# Patient Record
Sex: MALE | Race: WHITE | NOT HISPANIC OR LATINO | Employment: FULL TIME | URBAN - METROPOLITAN AREA
[De-identification: names, ages, dates, MRNs, and addresses within clinical notes are randomized per-mention and may not be internally consistent; named-entity substitution may affect disease eponyms.]

---

## 2023-09-25 ENCOUNTER — OFFICE VISIT (OUTPATIENT)
Dept: URGENT CARE | Facility: URGENT CARE | Age: 48
End: 2023-09-25
Payer: COMMERCIAL

## 2023-09-25 VITALS
HEART RATE: 61 BPM | BODY MASS INDEX: 25.03 KG/M2 | TEMPERATURE: 98.4 F | OXYGEN SATURATION: 98 % | DIASTOLIC BLOOD PRESSURE: 86 MMHG | WEIGHT: 195 LBS | HEIGHT: 74 IN | RESPIRATION RATE: 15 BRPM | SYSTOLIC BLOOD PRESSURE: 131 MMHG

## 2023-09-25 DIAGNOSIS — A63.0 GENITAL WARTS: Primary | ICD-10-CM

## 2023-09-25 PROCEDURE — 99203 OFFICE O/P NEW LOW 30 MIN: CPT | Performed by: PHYSICIAN ASSISTANT

## 2023-09-25 NOTE — PATIENT INSTRUCTIONS
Patient was educated on the natural course of genital wart. Discussed removal options if it bothers him. He will think it through and follow-up with dermatology if he desires removal. Seek emergency care if you develop severe pain or redness.

## 2023-09-25 NOTE — PROGRESS NOTES
"URGENT CARE VISIT:    SUBJECTIVE:   HPI:   Aj Marinelli is a 48 year old who presents with rash located over genital area since 6 week(s) ago. Rash is sudden onset and rash seems to be stable. He describes rash as asymptomatic. Patient denies difficulty breathing or throat/tongue swelling. Patient has tried none with no relief of symptoms. Patient has not had contact exposures to new laundry detergents, soaps, lotions, or other potential irritants. Denies new foods or medications.  Patient had similar lesion in the past which went away. No one around them has had a similar rash.     PMH: No past medical history on file.  Allergies: Patient has no known allergies.   Medications:   No current outpatient medications on file.     Social History:   Social History     Socioeconomic History    Marital status:      Spouse name: Not on file    Number of children: Not on file    Years of education: Not on file    Highest education level: Not on file   Occupational History    Not on file   Tobacco Use    Smoking status: Never    Smokeless tobacco: Never   Vaping Use    Vaping Use: Never used   Substance and Sexual Activity    Alcohol use: Not on file    Drug use: Not on file    Sexual activity: Not on file   Other Topics Concern    Not on file   Social History Narrative    Not on file     Social Determinants of Health     Financial Resource Strain: Not on file   Food Insecurity: Not on file   Transportation Needs: Not on file   Physical Activity: Not on file   Stress: Not on file   Social Connections: Not on file   Interpersonal Safety: Not on file   Housing Stability: Not on file       ROS: ROS otherwise found to be negative except as noted above.    OBJECTIVE:  /86   Pulse 61   Temp 98.4  F (36.9  C) (Oral)   Resp 15   Ht 1.88 m (6' 2\")   Wt 88.5 kg (195 lb)   SpO2 98%   BMI 25.04 kg/m    General: WDWN in NAD.   Eyes: Non-injected conjunctivas without drainage bilaterally.  Ears: Bilateral TMs are " easily visualized without erythema, injection, or effusion. No erythema or edema of external canals.    Oropharynx: No erythema of oropharynx. No edema of tongue.   Cardiac: RRR without murmurs, rubs, or gallops.  Respiratory: LCTAB without adventitious sounds. Non-labored breathing.  Integumentary:   Distribution: localized  Location: penis shaft    Color: skin colored,  Lesion type: irregularly shaped verrucous papule, scattered discrete lesions with no other findings  Neuro: Alert and oriented.       ASSESSMENT:     ICD-10-CM    1. Genital warts  A63.0            PLAN:  Patient Instructions   Patient was educated on the natural course of genital wart which sometimes may go away on their own but take time. Discussed removal options if it bothers him. He will think it through and follow-up with dermatology if he desires removal. Seek emergency care if you develop severe pain or redness.Patient verbalized understanding and is agreeable to plan. The patient was discharged ambulatory and in stable condition.    Lorri Julien PA-C on 9/25/2023 at 10:27 AM